# Patient Record
(demographics unavailable — no encounter records)

---

## 2024-10-15 NOTE — END OF VISIT
[Time Spent: ___ minutes] : I have spent [unfilled] minutes of time on the encounter which excludes teaching and separately reported services. [FreeTextEntry2] : This note was written by Tish Webster on 09/28/2023, acting solely as a scribe for Dr. Evert Vega MD.

## 2024-10-15 NOTE — ASSESSMENT
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 On 10/25/2022 MR Breast demonstrated findings consistent with inflammatory carcinoma involving the entire left breast. A 7mm internal mammary lymph node is noted.  S/p punch biopsy of L breast on 10/27/22 - positive for atypical cells in dermal lymphovascular spaces compatible with involvement by mammary carcinoma.  S/p b/l axilla US with abnormal LN bilaterally, s/p biopsy on 11/10/22 -c/w bilateral axillary LNs with poorly differentiated carcinoma, ER >99% [place] >99% HER2 3+ Positive S/p port placement on 11/9/22 Began TCHP on 11/17/22, carboplatin was dropped after 2 cycles given contralateral lymph node positive disease.  Completed 6 cycles of THP on 3/6/23. Unfortunately, she was hospitalized at [facility] from 3/15/23-5/12/23 for septic shock, ARDS s/p ECMO, required vent / PEG, CRRT. Fully recovered. Given stage IV disease, resumed Herceptin / Perjeta on 9/7/23, and anastrozole was started. She developed diarrhea & she refused further treatment altogether.   Now presenting with left breast mass, ulcerated nodule left upper inner quadrant and erythema occupying left breast and right breast. +Back pain   Plan: PET and brain MRI asap, will call with results.  Will need biopsy after PET  Labs today Neoplasm related pain - Gabapentin and oxycodone Rx'ed RTO after above work up

## 2024-10-15 NOTE — HISTORY OF PRESENT ILLNESS
[de-identified] : Ms. Negron is a 52 year old female diagnosed with left breast 8.6cm invasive mammary carcinoma ER >90% [place] >90% HER2 3+ Positive at age 51.  Patient presents for initial visit with daughter. Pt's native language is Greenlandic. Language Line  # Larisa #[id number]  Patient obtained bilateral mammogram and left sonogram on 9/22/2022 demonstrating right multiple small nodules central right breast, recommendation for targeted imaging and left 3cm mass like area of shadowing retroareolar left breast with skin thickening in conjunction with clinical symptoms, 2cm area of microcalcificatoins left UOQ, recommendation for left central, retroareolar US guided biopsy.  10/11/2022 Underwent left breast retroareolar biopsy- invasive mammary carcinoma with ductal and lobular features,  ER >90% [place] >90% HER2 3+, measuring 1.5cm, foci suspicious for intralymphatic tumor embolia are present, cystic papillary apocrine metaplasia   10/25/2022 MR Breasts- findings consistent with inflammatory carcinoma involving the entire left breast. A 7mm internal mammary lymph node is noted. The right breast in unremarkable. No adenopathy is identified.   PMH: Denies  FMH: Denies Sx: Tubal ligation  Socx:  Non smoker, no Etoh.   Care Team:  Breast Surgeon, Dr. Ladd   Treatment summary: -Began TCHP on 11/17/22, carboplatin was dropped after 2 cycles given contralateral lymph node positive disease. -Completed 6 cycles of THP on 3/6/23. -Hospitalized at [facility] from 3/15/23-5/12/23 for septic shock, ARDS s/p ECMO, required vent / PEG, CRRT. [de-identified] : Patient presents for follow up visit, accompanied by her daughters. Language line  #[id number] She reports breast pain bilaterally, initially started with left breast pain and redness, and then also noted right breast pain and redness. Notes burning pain.  She also reports back pain x 1 week. Denies headaches, dizziness, changes in balance.  Port removal 10/23/23

## 2024-10-15 NOTE — REVIEW OF SYSTEMS
[Fever] : fever [Fatigue] : fatigue [Abdominal Pain] : abdominal pain [Vomiting] : vomiting [Diarrhea: Grade 2 - Increase of 4 - 6 stools per day over baseline; moderate increase in ostomy output compared to baseline] : Diarrhea: Grade 2 - Increase of 4 - 6 stools per day over baseline; moderate increase in ostomy output compared to baseline [FreeTextEntry2] : fever 1x day [de-identified] : 4-5x day; 15 days

## 2024-10-15 NOTE — PHYSICAL EXAM
[Ambulatory and capable of all self care but unable to carry out any work activities] : Status 2- Ambulatory and capable of all self care but unable to carry out any work activities. Up and about more than 50% of waking hours [Normal] : PERRL, EOMI, no conjunctival infection, anicteric [de-identified] : left breast mass occupying most of the left breast, left breast is smaller than the right, 1 cm ulcerated nodule in the upper inner left breast, erythema occupying about half of left breast medially, and laso involing right breast.

## 2024-10-31 NOTE — BEGINNING OF VISIT
[0] : 2) Feeling down, depressed, or hopeless: Not at all (0) [[id number]] : 0 [Pain Scale: ___] : On a scale of 1-10, today the patient's pain is a(n) [unfilled]. [Medication(s)] : Medication(s) [Never] : Never [Patient/Caregiver not ready to engage] : Patient/Caregiver not ready to engage

## 2024-10-31 NOTE — HISTORY OF PRESENT ILLNESS
[de-identified] : Ms. Negron is a 52 year old female diagnosed with left breast 8.6cm invasive mammary carcinoma ER >90% [place] >90% HER2 3+ Positive at age 51.  Patient presents for initial visit with daughter. Pt's native language is Guyanese. Language Line  # Larisa #[id number]  Patient obtained bilateral mammogram and left sonogram on 9/22/2022 demonstrating right multiple small nodules central right breast, recommendation for targeted imaging and left 3cm mass like area of shadowing retroareolar left breast with skin thickening in conjunction with clinical symptoms, 2cm area of microcalcificatoins left UOQ, recommendation for left central, retroareolar US guided biopsy.  10/11/2022 Underwent left breast retroareolar biopsy- invasive mammary carcinoma with ductal and lobular features,  ER >90% [place] >90% HER2 3+, measuring 1.5cm, foci suspicious for intralymphatic tumor embolia are present, cystic papillary apocrine metaplasia   10/25/2022 MR Breasts- findings consistent with inflammatory carcinoma involving the entire left breast. A 7mm internal mammary lymph node is noted. The right breast in unremarkable. No adenopathy is identified.   PMH: Denies  FMH: Denies Sx: Tubal ligation  Socx:  Non smoker, no Etoh.   Care Team:  Breast Surgeon, Dr. Ladd   Treatment summary: -Began TCHP on 11/17/22, carboplatin was dropped after 2 cycles given contralateral lymph node positive disease. -Completed 6 cycles of THP on 3/6/23. -Hospitalized at [facility] from 3/15/23-5/12/23 for septic shock, ARDS s/p ECMO, required vent / PEG, CRRT. -Resumed Herceptin / Perjeta on 9/7/23, and anastrozole was started. She developed diarrhea & she refused further treatment altogether.    [de-identified] : Patient presents for follow up visit, accompanied by her daughters. Language line  #[id number] S/p right axillary LN biopsy on 10/24/24 Notes Oxycodone and Gabapentin  help with the pain, but breast pain returns after a few hours.

## 2024-10-31 NOTE — REVIEW OF SYSTEMS
[Fever] : fever [Fatigue] : fatigue [Abdominal Pain] : abdominal pain [Vomiting] : vomiting [Diarrhea: Grade 2 - Increase of 4 - 6 stools per day over baseline; moderate increase in ostomy output compared to baseline] : Diarrhea: Grade 2 - Increase of 4 - 6 stools per day over baseline; moderate increase in ostomy output compared to baseline [FreeTextEntry2] : fever 1x day [de-identified] : 4-5x day; 15 days

## 2024-10-31 NOTE — PHYSICAL EXAM
[Normal] : affect appropriate [Restricted in physically strenuous activity but ambulatory and able to carry out work of a light or sedentary nature] : Status 1- Restricted in physically strenuous activity but ambulatory and able to carry out work of a light or sedentary nature, e.g., light house work, office work [de-identified] : left breast mass occupying most of the left breast, left breast is smaller than the right, 1 cm ulcerated nodule in the upper inner left breast, erythema occupying about half of left breast medially, and laso involing right breast.

## 2024-10-31 NOTE — PHYSICAL EXAM
[Normal] : affect appropriate [Restricted in physically strenuous activity but ambulatory and able to carry out work of a light or sedentary nature] : Status 1- Restricted in physically strenuous activity but ambulatory and able to carry out work of a light or sedentary nature, e.g., light house work, office work [de-identified] : left breast mass occupying most of the left breast, left breast is smaller than the right, 1 cm ulcerated nodule in the upper inner left breast, erythema occupying about half of left breast medially, and laso involing right breast.

## 2024-10-31 NOTE — BEGINNING OF VISIT
[0] : 2) Feeling down, depressed, or hopeless: Not at all (0) [[id number]] : 0 [Pain Scale: ___] : On a scale of 1-10, today the patient's pain is a(n) [unfilled]. [Medication(s)] : Medication(s) [Never] : Never [Patient/Caregiver not ready to engage] : Patient/Caregiver not ready to engage

## 2024-10-31 NOTE — REVIEW OF SYSTEMS
[Fever] : fever [Fatigue] : fatigue [Abdominal Pain] : abdominal pain [Vomiting] : vomiting [Diarrhea: Grade 2 - Increase of 4 - 6 stools per day over baseline; moderate increase in ostomy output compared to baseline] : Diarrhea: Grade 2 - Increase of 4 - 6 stools per day over baseline; moderate increase in ostomy output compared to baseline [FreeTextEntry2] : fever 1x day [de-identified] : 4-5x day; 15 days

## 2024-10-31 NOTE — ASSESSMENT
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 On 10/25/2022 MR Breast demonstrated findings consistent with inflammatory carcinoma involving the entire left breast. A 7mm internal mammary lymph node is noted.  S/p punch biopsy of L breast on 10/27/22 - positive for atypical cells in dermal lymphovascular spaces compatible with involvement by mammary carcinoma.  S/p b/l axilla US with abnormal LN bilaterally, s/p biopsy on 11/10/22 -c/w bilateral axillary LNs with poorly differentiated carcinoma, ER >99% [place] >99% HER2 3+ Positive S/p port placement on 11/9/22 Began TCHP on 11/17/22, carboplatin was dropped after 2 cycles given contralateral lymph node positive disease.  Completed 6 cycles of THP on 3/6/23. Unfortunately, she was hospitalized at [facility] from 3/15/23-5/12/23 for septic shock, ARDS s/p ECMO, required vent / PEG, CRRT. Fully recovered. Given stage IV disease, resumed Herceptin / Perjeta on 9/7/23, and anastrozole was started. She developed diarrhea & she refused further treatment altogether.   Reviewed 10/21/24 PET - 1. Abnormal FDG uptake in the skin of both breasts, subareolar regions, and breast parenchyma. FDG avid soft tissue nodule anterior to the left pectoralis. FDG uptake in the right pectoralis muscle. FDG avid left inner breast cutaneous nodule. This is all concerning for recurrent breast cancer. 2. FDG avid bilateral axillary lymphadenopathy, consistent with metastases 10/24/24 Brain MRI - [facility]  10/24/25 right axilla LN biopsy - Metastatic carcinoma, consistent with metastasis from breast primary. ER 90%, [place] <1%, HER2 3+  Plan: Stage IV disease given contralateral lymph node involvement. Discussed palliative weekly Taxol x 12-16 weeks with Herceptin / Perjeta q 3 weeks ongoing.  We reviewed side effects of systemic therapy to include but not limited to fatigue, N/V, diarrhea, liver/kidney dysfunction,  myelosuppression,  heart dysfunction, increased risk of infections, neuropathy. She signed informed consent. Extensively reviewed management of diarrhea with Imodium and its appropriate usage of 2 pills after first episode of diarrhea, and then 1 tab after each subsequent dose, and can take total 8 tablets per day. Neoplasm related pain - Gabapentin and oxycodone Rx'ed Echo - asap Chemotherapy to begin after echo is obtain Will plan for port placement also.  Antiestrogen therapy once maintenance HP begins.  Chemo teaching today  RTO with week 3 treatment

## 2024-10-31 NOTE — ASSESSMENT
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 On 10/25/2022 MR Breast demonstrated findings consistent with inflammatory carcinoma involving the entire left breast. A 7mm internal mammary lymph node is noted.  S/p punch biopsy of L breast on 10/27/22 - positive for atypical cells in dermal lymphovascular spaces compatible with involvement by mammary carcinoma.  S/p b/l axilla US with abnormal LN bilaterally, s/p biopsy on 11/10/22 -c/w bilateral axillary LNs with poorly differentiated carcinoma, ER >99% [place] >99% HER2 3+ Positive S/p port placement on 11/9/22 Began TCHP on 11/17/22, carboplatin was dropped after 2 cycles given contralateral lymph node positive disease.  Completed 6 cycles of THP on 3/6/23. Unfortunately, she was hospitalized at [facility] from 3/15/23-5/12/23 for septic shock, ARDS s/p ECMO, required vent / PEG, CRRT. Fully recovered. Given stage IV disease, resumed Herceptin / Perjeta on 9/7/23, and anastrozole was started. She developed diarrhea & she refused further treatment altogether.   Reviewed 10/21/24 PET - 1. Abnormal FDG uptake in the skin of both breasts, subareolar regions, and breast parenchyma. FDG avid soft tissue nodule anterior to the left pectoralis. FDG uptake in the right pectoralis muscle. FDG avid left inner breast cutaneous nodule. This is all concerning for recurrent breast cancer. 2. FDG avid bilateral axillary lymphadenopathy, consistent with metastases 10/24/24 Brain MRI - [facility]  10/24/25 right axilla LN biopsy - Metastatic carcinoma, consistent with metastasis from breast primary. ER 90%, [place] <1%, HER2 3+  Plan: Stage IV disease given contralateral lymph node involvement. Discussed palliative weekly Taxol x 12-16 weeks with Herceptin / Perjeta q 3 weeks ongoing.  We reviewed side effects of systemic therapy to include but not limited to fatigue, N/V, diarrhea, liver/kidney dysfunction,  myelosuppression,  heart dysfunction, increased risk of infections, neuropathy. She signed informed consent. Extensively reviewed management of diarrhea with Imodium and its appropriate usage of 2 pills after first episode of diarrhea, and then 1 tab after each subsequent dose, and can take total 8 tablets per day. Neoplasm related pain - Gabapentin and oxycodone Rx'ed Echo - asap Chemotherapy to begin after echo is obtain Will plan for port placement also.  Antiestrogen therapy once maintenance HP begins.  Chemo teaching today  RTO with week 3 treatment

## 2024-10-31 NOTE — HISTORY OF PRESENT ILLNESS
[de-identified] : Ms. Negron is a 52 year old female diagnosed with left breast 8.6cm invasive mammary carcinoma ER >90% [place] >90% HER2 3+ Positive at age 51.  Patient presents for initial visit with daughter. Pt's native language is Citizen of [place]. Language Line  # Larisa #[id number]  Patient obtained bilateral mammogram and left sonogram on 9/22/2022 demonstrating right multiple small nodules central right breast, recommendation for targeted imaging and left 3cm mass like area of shadowing retroareolar left breast with skin thickening in conjunction with clinical symptoms, 2cm area of microcalcificatoins left UOQ, recommendation for left central, retroareolar US guided biopsy.  10/11/2022 Underwent left breast retroareolar biopsy- invasive mammary carcinoma with ductal and lobular features,  ER >90% [place] >90% HER2 3+, measuring 1.5cm, foci suspicious for intralymphatic tumor embolia are present, cystic papillary apocrine metaplasia   10/25/2022 MR Breasts- findings consistent with inflammatory carcinoma involving the entire left breast. A 7mm internal mammary lymph node is noted. The right breast in unremarkable. No adenopathy is identified.   PMH: Denies  FMH: Denies Sx: Tubal ligation  Socx:  Non smoker, no Etoh.   Care Team:  Breast Surgeon, Dr. Ladd   Treatment summary: -Began TCHP on 11/17/22, carboplatin was dropped after 2 cycles given contralateral lymph node positive disease. -Completed 6 cycles of THP on 3/6/23. -Hospitalized at [facility] from 3/15/23-5/12/23 for septic shock, ARDS s/p ECMO, required vent / PEG, CRRT. -Resumed Herceptin / Perjeta on 9/7/23, and anastrozole was started. She developed diarrhea & she refused further treatment altogether.    [de-identified] : Patient presents for follow up visit, accompanied by her daughters. Language line  #[id number] S/p right axillary LN biopsy on 10/24/24 Notes Oxycodone and Gabapentin  help with the pain, but breast pain returns after a few hours.

## 2024-11-02 NOTE — PROCEDURE
[FreeTextEntry3] : Ultrasound-guided core biopsy right x-ray lymph node  After informed consent was obtained, 1% lidocaine was infiltrated to the skin of the right axilla and core biopsy of the indeterminate right axillary lymph node exhibiting a thickened cortex was performed  No procedure related complications were noted

## 2024-11-02 NOTE — HISTORY OF PRESENT ILLNESS
[FreeTextEntry1] : Referred by Dr. Vega.  Patient states she has been experiencing bilateral axilla pain and burning sensation.  Patient currently has right breast cancer.  History of left breast cancer, treated with chemotherapy.   No family history of breast cancer.

## 2024-11-02 NOTE — ASSESSMENT
[FreeTextEntry1] : Ultrasound-guided core biopsy right axillary lymph node  Care plan reviewed with patient and daughter  Further recommendations will be made pending results of the biopsy  A total of 45 minutes was spent in consultation

## 2024-12-05 NOTE — BEGINNING OF VISIT
[0] : 2) Feeling down, depressed, or hopeless: Not at all (0) [[id number]] : 0 [Pain Scale: ___] : On a scale of 1-10, today the patient's pain is a(n) [unfilled]. [Never] : Never [Patient/Caregiver not ready to engage] : Patient/Caregiver not ready to engage

## 2024-12-05 NOTE — PHYSICAL EXAM
[Restricted in physically strenuous activity but ambulatory and able to carry out work of a light or sedentary nature] : Status 1- Restricted in physically strenuous activity but ambulatory and able to carry out work of a light or sedentary nature, e.g., light house work, office work [Normal] : affect appropriate [de-identified] : Decrease in erythema of both breasts, more symmetric, no oozing from left breast

## 2024-12-05 NOTE — ASSESSMENT
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 On 10/25/2022 MR Breast demonstrated findings consistent with inflammatory carcinoma involving the entire left breast. A 7mm internal mammary lymph node is noted.  S/p punch biopsy of L breast on 10/27/22 - positive for atypical cells in dermal lymphovascular spaces compatible with involvement by mammary carcinoma.  S/p b/l axilla US with abnormal LN bilaterally, s/p biopsy on 11/10/22 -c/w bilateral axillary LNs with poorly differentiated carcinoma, ER >99% [place] >99% HER2 3+ Positive S/p port placement on 11/9/22 Began TCHP on 11/17/22, carboplatin was dropped after 2 cycles given contralateral lymph node positive disease.  Completed 6 cycles of THP on 3/6/23. Unfortunately, she was hospitalized at [facility] from 3/15/23-5/12/23 for septic shock, ARDS s/p ECMO, required vent / PEG, CRRT. Fully recovered. Given stage IV disease, resumed Herceptin / Perjeta on 9/7/23, and anastrozole was started. She developed diarrhea & she refused further treatment altogether.    #stage IV left inflammatory breast cancer, with contralateral lymph node involvement, ER 90%, [place] <1%, HER2 3+ -Relapsed disease in 10/2024.  10/21/24 PET - 1. Abnormal FDG uptake in the skin of both breasts, subareolar regions, and breast parenchyma. FDG avid soft tissue nodule anterior to the left pectoralis. FDG uptake in the right pectoralis muscle. FDG avid left inner breast cutaneous nodule. This is all concerning for recurrent breast cancer. 2. FDG avid bilateral axillary lymphadenopathy, consistent with metastases 10/24/24 Brain MRI - [facility] -10/24/25 right axilla LN biopsy - Metastatic carcinoma, consistent with metastasis from breast primary. ER 90%, [place] <1%, HER2 3+ -relapse >12 months from last Her2 directed therapy -11/14/24 started Taxol /Herceptin / Perjeta -today is C2 Taxol+HP, tolerating treatment well -continue treatment. -antiestrogen therapy once on maintenance HP  RTO 6 weeks

## 2024-12-05 NOTE — ASSESSMENT
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 On 10/25/2022 MR Breast demonstrated findings consistent with inflammatory carcinoma involving the entire left breast. A 7mm internal mammary lymph node is noted.  S/p punch biopsy of L breast on 10/27/22 - positive for atypical cells in dermal lymphovascular spaces compatible with involvement by mammary carcinoma.  S/p b/l axilla US with abnormal LN bilaterally, s/p biopsy on 11/10/22 -c/w bilateral axillary LNs with poorly differentiated carcinoma, ER >99% [place] >99% HER2 3+ Positive S/p port placement on 11/9/22 Began TCHP on 11/17/22, carboplatin was dropped after 2 cycles given contralateral lymph node positive disease.  Completed 6 cycles of THP on 3/6/23. Unfortunately, she was hospitalized at [facility] from 3/15/23-5/12/23 for septic shock, ARDS s/p ECMO, required vent / PEG, CRRT. Fully recovered. Given stage IV disease, resumed Herceptin / Perjeta on 9/7/23, and anastrozole was started. She developed diarrhea & she refused further treatment altogether.    #stage IV left inflammatory breast cancer, with contralateral lymph node involvement, ER 90%, [place] <1%, HER2 3+ -Relapsed disease in 10/2024.  10/21/24 PET - 1. Abnormal FDG uptake in the skin of both breasts, subareolar regions, and breast parenchyma. FDG avid soft tissue nodule anterior to the left pectoralis. FDG uptake in the right pectoralis muscle. FDG avid left inner breast cutaneous nodule. This is all concerning for recurrent breast cancer. 2. FDG avid bilateral axillary lymphadenopathy, consistent with metastases 10/24/24 Brain MRI - [facility] -10/24/25 right axilla LN biopsy - Metastatic carcinoma, consistent with metastasis from breast primary. ER 90%, [place] <1%, HER2 3+ -relapse >12 months from last Her2 directed therapy -11/14/24 started Taxol /Herceptin / Perjeta -today is C2 Taxol+HP, tolerating treatment well -continue treatment. -antiestrogen therapy once on maintenance HP  RTO 6 weeks

## 2024-12-05 NOTE — HISTORY OF PRESENT ILLNESS
[de-identified] : Ms. Negron is a 52 year old female diagnosed with left breast 8.6cm invasive mammary carcinoma ER >90% [place] >90% HER2 3+ Positive at age 51.  Patient presents for initial visit with daughter. Pt's native language is [place]. Language Line  # Larisa #[id number]  Patient obtained bilateral mammogram and left sonogram on 9/22/2022 demonstrating right multiple small nodules central right breast, recommendation for targeted imaging and left 3cm mass like area of shadowing retroareolar left breast with skin thickening in conjunction with clinical symptoms, 2cm area of microcalcificatoins left UOQ, recommendation for left central, retroareolar US guided biopsy.  10/11/2022 Underwent left breast retroareolar biopsy- invasive mammary carcinoma with ductal and lobular features,  ER >90% [place] >90% HER2 3+, measuring 1.5cm, foci suspicious for intralymphatic tumor embolia are present, cystic papillary apocrine metaplasia   10/25/2022 MR Breasts- findings consistent with inflammatory carcinoma involving the entire left breast. A 7mm internal mammary lymph node is noted. The right breast in unremarkable. No adenopathy is identified.   PMH: Denies  FMH: Denies Sx: Tubal ligation  Socx:  Non smoker, no Etoh.   Care Team:  Breast Surgeon, Dr. Ladd   Treatment summary: -Began TCHP on 11/17/22, carboplatin was dropped after 2 cycles given contralateral lymph node positive disease. -Completed 6 cycles of THP on 3/6/23. -Hospitalized at [facility] from 3/15/23-5/12/23 for septic shock, ARDS s/p ECMO, required vent / PEG, CRRT. -Resumed Herceptin / Perjeta on 9/7/23, and anastrozole was started. She developed diarrhea & she refused further treatment altogether.    [de-identified] : Patient presents for follow up visit, accompanied by her daughter Started weekly Taxol / Herceptin / Perjeta 11/14/24 Notes improvement in breast pain, decrease in erythema. Not taking pain meds anymore.  No neuropathy Mild fatigue Good appetite Rare nausea.

## 2024-12-05 NOTE — BEGINNING OF VISIT
[0] : 2) Feeling down, depressed, or hopeless: Not at all (0) [[id number]] : 0 [Pain Scale: ___] : On a scale of 1-10, today the patient's pain is a(n) [unfilled]. [Never] : Never [Patient/Caregiver not ready to engage] : Patient/Caregiver not ready to engage

## 2024-12-05 NOTE — PHYSICAL EXAM
[Restricted in physically strenuous activity but ambulatory and able to carry out work of a light or sedentary nature] : Status 1- Restricted in physically strenuous activity but ambulatory and able to carry out work of a light or sedentary nature, e.g., light house work, office work [Normal] : affect appropriate [de-identified] : Decrease in erythema of both breasts, more symmetric, no oozing from left breast

## 2024-12-05 NOTE — HISTORY OF PRESENT ILLNESS
[de-identified] : Ms. Negron is a 52 year old female diagnosed with left breast 8.6cm invasive mammary carcinoma ER >90% MO >90% HER2 3+ Positive at age 51.  Patient presents for initial visit with daughter. Pt's native language is East Timorese. Language Line  # Larisa #[id number]  Patient obtained bilateral mammogram and left sonogram on 9/22/2022 demonstrating right multiple small nodules central right breast, recommendation for targeted imaging and left 3cm mass like area of shadowing retroareolar left breast with skin thickening in conjunction with clinical symptoms, 2cm area of microcalcificatoins left UOQ, recommendation for left central, retroareolar US guided biopsy.  10/11/2022 Underwent left breast retroareolar biopsy- invasive mammary carcinoma with ductal and lobular features,  ER >90% MO >90% HER2 3+, measuring 1.5cm, foci suspicious for intralymphatic tumor embolia are present, cystic papillary apocrine metaplasia   10/25/2022 MR Breasts- findings consistent with inflammatory carcinoma involving the entire left breast. A 7mm internal mammary lymph node is noted. The right breast in unremarkable. No adenopathy is identified.   PMH: Denies  FMH: Denies Sx: Tubal ligation  Socx:  Non smoker, no Etoh.   Care Team:  Breast Surgeon, Dr. Ladd   Treatment summary: -Began TCHP on 11/17/22, carboplatin was dropped after 2 cycles given contralateral lymph node positive disease. -Completed 6 cycles of THP on 3/6/23. -Hospitalized at [facility] from 3/15/23-5/12/23 for septic shock, ARDS s/p ECMO, required vent / PEG, CRRT. -Resumed Herceptin / Perjeta on 9/7/23, and anastrozole was started. She developed diarrhea & she refused further treatment altogether.    [de-identified] : Patient presents for follow up visit, accompanied by her daughter Started weekly Taxol / Herceptin / Perjeta 11/14/24 Notes improvement in breast pain, decrease in erythema. Not taking pain meds anymore.  No neuropathy Mild fatigue Good appetite Rare nausea.

## 2024-12-05 NOTE — REVIEW OF SYSTEMS
[Diarrhea: Grade 2 - Increase of 4 - 6 stools per day over baseline; moderate increase in ostomy output compared to baseline] : Diarrhea: Grade 2 - Increase of 4 - 6 stools per day over baseline; moderate increase in ostomy output compared to baseline [FreeTextEntry2] : negative except as reviewed in interval history [de-identified] : 4-5x day; 15 days

## 2025-01-16 NOTE — ASSESSMENT
[FreeTextEntry1] : 53 year old  (LMP 8/2022) female diagnosed with left breast 8.6cm invasive mammary carcinoma ER >99% OH >99% HER2 3+ Positive. On 10/25/2022 MR Breast demonstrated findings consistent with inflammatory carcinoma involving the entire left breast. A 7mm internal mammary lymph node is noted.  S/p punch biopsy of L breast on 10/27/22 - positive for atypical cells in dermal lymphovascular spaces compatible with involvement by mammary carcinoma.  S/p b/l axilla US with abnormal LN bilaterally, s/p biopsy on 11/10/22 -c/w bilateral axillary LNs with poorly differentiated carcinoma, ER >99% OH >99% HER2 3+ Positive S/p port placement on 11/9/22 Began TCHP on 11/17/22, carboplatin was dropped after 2 cycles given contralateral lymph node positive disease.  Completed 6 cycles of THP on 3/6/23. Unfortunately, she was hospitalized at [facility] from 3/15/23-5/12/23 for septic shock, ARDS s/p ECMO, required vent / PEG, CRRT. Fully recovered. Given stage IV disease, resumed Herceptin / Perjeta on 9/7/23, and anastrozole was started. She developed diarrhea & she refused further treatment altogether.    #stage IV left inflammatory breast cancer, with contralateral lymph node involvement, ER 90%, OH <1%, HER2 3+ -Relapsed disease in 10/2024.  10/21/24 PET - 1. Abnormal FDG uptake in the skin of both breasts, subareolar regions, and breast parenchyma. FDG avid soft tissue nodule anterior to the left pectoralis. FDG uptake in the right pectoralis muscle. FDG avid left inner breast cutaneous nodule. This is all concerning for recurrent breast cancer. 2. FDG avid bilateral axillary lymphadenopathy, consistent with metastases 10/24/24 Brain MRI - NERI -10/24/25 right axilla LN biopsy - Metastatic carcinoma, consistent with metastasis from breast primary. ER 90%, OH <1%, HER2 3+ -relapse >12 months from last Her2 directed therapy -11/14/24 started Taxol /Herceptin / Perjeta -today is C4d1 Taxol+HP, tolerating treatment well -continue treatment. -Pet scan ordered, advised to schedule asap -antiestrogen therapy once on maintenance HP  RTO 6 weeks

## 2025-01-16 NOTE — PHYSICAL EXAM
[Restricted in physically strenuous activity but ambulatory and able to carry out work of a light or sedentary nature] : Status 1- Restricted in physically strenuous activity but ambulatory and able to carry out work of a light or sedentary nature, e.g., light house work, office work [Normal] : no peripheral adenopathy appreciated [de-identified] : Decrease in erythema of both breasts, more symmetric, no oozing from left breast, mild hardness on the left inner lower quadrant, non tender

## 2025-01-16 NOTE — REASON FOR VISIT
[Follow-Up Visit] : a follow-up [Other: _____] : [unfilled] [Language Line ] : provided by Language Line   [Time Spent: ____ minutes] : Total time spent using  services: [unfilled] minutes. The patient's primary language is not English thus required  services. [FreeTextEntry2] : Breast Cancer  [Interpreters_IDNumber] : [id number] [Interpreters_FullName] : Richard [TWNoteComboBox1] : [place] African

## 2025-01-16 NOTE — HISTORY OF PRESENT ILLNESS
[de-identified] : Ms. Negron is a 52 year old female diagnosed with left breast 8.6cm invasive mammary carcinoma ER >90% LA >90% HER2 3+ Positive at age 51.  Patient presents for initial visit with daughter. Pt's native language is Algerian. Language Line  # Larisa #[id number]  Patient obtained bilateral mammogram and left sonogram on 9/22/2022 demonstrating right multiple small nodules central right breast, recommendation for targeted imaging and left 3cm mass like area of shadowing retroareolar left breast with skin thickening in conjunction with clinical symptoms, 2cm area of microcalcificatoins left UOQ, recommendation for left central, retroareolar US guided biopsy.  10/11/2022 Underwent left breast retroareolar biopsy- invasive mammary carcinoma with ductal and lobular features,  ER >90% LA >90% HER2 3+, measuring 1.5cm, foci suspicious for intralymphatic tumor embolia are present, cystic papillary apocrine metaplasia   10/25/2022 MR Breasts- findings consistent with inflammatory carcinoma involving the entire left breast. A 7mm internal mammary lymph node is noted. The right breast in unremarkable. No adenopathy is identified.   PMH: Denies  FMH: Denies Sx: Tubal ligation  Socx:  Non smoker, no Etoh.   Care Team:  Breast Surgeon, Dr. Ladd   Treatment summary: -Began TCHP on 11/17/22, carboplatin was dropped after 2 cycles given contralateral lymph node positive disease. -Completed 6 cycles of THP on 3/6/23. -Hospitalized at [facility] from 3/15/23-5/12/23 for septic shock, ARDS s/p ECMO, required vent / PEG, CRRT. -Resumed Herceptin / Perjeta on 9/7/23, and anastrozole was started. She developed diarrhea & she refused further treatment altogether.    [de-identified] : Patient presents for follow up and treatment visit today. Met patient at the treatment room chair side. Daughter present during visit.  Richard ID # [id number] Started THP on 11/14/24, C4D1 due today Reports improvement in breast pain, decrease in erythema and less hardness.   Reports mild fatigue Reports taste not good, able to eat food Reports diarrhea, 3 days after treatment 2-3/day for 1-2 days, imodium helps Denies numbness, tingling or burning of hands and feet  Denies nausea, vomiting, abdominal pain, mucositis Denies sob, chest pain Denies fever, infection

## 2025-01-16 NOTE — REVIEW OF SYSTEMS
[Diarrhea: Grade 2 - Increase of 4 - 6 stools per day over baseline; moderate increase in ostomy output compared to baseline] : Diarrhea: Grade 2 - Increase of 4 - 6 stools per day over baseline; moderate increase in ostomy output compared to baseline [Fever] : no fever [Chills] : no chills [Night Sweats] : no night sweats [Fatigue] : fatigue [Recent Change In Weight] : ~T no recent weight change [Negative] : Allergic/Immunologic [de-identified] : 4-5x day; 15 days

## 2025-02-24 NOTE — HISTORY OF PRESENT ILLNESS
[de-identified] : Ms. Negron is a 52 year old female diagnosed with left breast 8.6cm invasive mammary carcinoma ER >90% MI >90% HER2 3+ Positive at age 51.  Patient presents for initial visit with daughter. Pt's native language is Beninese. Language Line  # Larisa #[id number]  Patient obtained bilateral mammogram and left sonogram on 9/22/2022 demonstrating right multiple small nodules central right breast, recommendation for targeted imaging and left 3cm mass like area of shadowing retroareolar left breast with skin thickening in conjunction with clinical symptoms, 2cm area of microcalcificatoins left UOQ, recommendation for left central, retroareolar US guided biopsy.  10/11/2022 Underwent left breast retroareolar biopsy- invasive mammary carcinoma with ductal and lobular features,  ER >90% MI >90% HER2 3+, measuring 1.5cm, foci suspicious for intralymphatic tumor embolia are present, cystic papillary apocrine metaplasia   10/25/2022 MR Breasts- findings consistent with inflammatory carcinoma involving the entire left breast. A 7mm internal mammary lymph node is noted. The right breast in unremarkable. No adenopathy is identified.   PMH: Denies  FMH: Denies Sx: Tubal ligation  Socx:  Non smoker, no Etoh.   Care Team:  Breast Surgeon, Dr. Ladd   Treatment summary: -Began TCHP on 11/17/22, carboplatin was dropped after 2 cycles given contralateral lymph node positive disease. -Completed 6 cycles of THP on 3/6/23. -Hospitalized at [facility] from 3/15/23-5/12/23 for septic shock, ARDS s/p ECMO, required vent / PEG, CRRT. -Resumed Herceptin / Perjeta on 9/7/23, and anastrozole was started. She developed diarrhea & she refused further treatment altogether.    [de-identified] : Patient presents for follow up with daughter   Ricardo ID # [id number] Started THP on 11/14/24, C4D1 on 1/16/25 Received C5 HP on 2/6/25, s/p weekly taxol on 2/20/25 s/p hospitalization at [facility] for URI, discharged on 1/30/25, s/p abx for infection She reports feeling fine overall Denies fever, but reported throat pain, was coughing up phlegm, now currently denies throat pain Pt reports skin of breast is significantly improved She reports her hand and feet are sensitive Denies headaches, dizziness, balance issues Family reports some diarrhea, not everyday, usually happens on day of treatment and 2 days after, takes Immodium with relief

## 2025-02-24 NOTE — ADDENDUM
[FreeTextEntry1] : Documented by Onur Harris acting as scribe for Dr. Vega on  02/24/2025.    All Medical record entries made by the Scribe were at my, Dr. Vega's, direction and personally dictated by me on  02/24/2025. I have reviewed the chart and agree that the record accurately reflects my personal performance of the history, physical exam, assessment and plan. I have also personally directed, reviewed, and agreed with the discharge instructions.

## 2025-02-24 NOTE — REVIEW OF SYSTEMS
[Fatigue] : fatigue [Diarrhea: Grade 2 - Increase of 4 - 6 stools per day over baseline; moderate increase in ostomy output compared to baseline] : Diarrhea: Grade 2 - Increase of 4 - 6 stools per day over baseline; moderate increase in ostomy output compared to baseline [Negative] : Allergic/Immunologic [Fever] : no fever [Chills] : no chills [Night Sweats] : no night sweats [Recent Change In Weight] : ~T no recent weight change [de-identified] : 4-5x day; 15 days

## 2025-02-24 NOTE — BEGINNING OF VISIT
[Medical reason not done] : Medical reason not done [0] : 2) Feeling down, depressed, or hopeless: Not at all (0) [Never] : Never [Date Discussed (MM/DD/YY): ___] : Discussed: [unfilled] [Patient/Caregiver not ready to engage] : Patient/Caregiver not ready to engage [[id number]] : 0 [Pain Scale: ___] : On a scale of 1-10, today the patient's pain is a(n) [unfilled].

## 2025-02-24 NOTE — PHYSICAL EXAM
[Restricted in physically strenuous activity but ambulatory and able to carry out work of a light or sedentary nature] : Status 1- Restricted in physically strenuous activity but ambulatory and able to carry out work of a light or sedentary nature, e.g., light house work, office work [Normal] : affect appropriate [de-identified] : decrease in erythema of both breasts medially, left is greater than right

## 2025-02-24 NOTE — ASSESSMENT
[FreeTextEntry1] : 53 year old  (LMP 8/2022) female diagnosed with left breast 8.6cm invasive mammary carcinoma ER >99% MI >99% HER2 3+ Positive. On 10/25/2022 MR Breast demonstrated findings consistent with inflammatory carcinoma involving the entire left breast. A 7mm internal mammary lymph node is noted.  S/p punch biopsy of L breast on 10/27/22 - positive for atypical cells in dermal lymphovascular spaces compatible with involvement by mammary carcinoma.  S/p b/l axilla US with abnormal LN bilaterally, s/p biopsy on 11/10/22 -c/w bilateral axillary LNs with poorly differentiated carcinoma, ER >99% MI >99% HER2 3+ Positive S/p port placement on 11/9/22 Began TCHP on 11/17/22, carboplatin was dropped after 2 cycles given contralateral lymph node positive disease.  Completed 6 cycles of THP on 3/6/23. Unfortunately, she was hospitalized at [facility] from 3/15/23-5/12/23 for septic shock, ARDS s/p ECMO, required vent / PEG, CRRT. Fully recovered. Given stage IV disease, resumed Herceptin / Perjeta on 9/7/23, and anastrozole was started. She developed diarrhea & she refused further treatment altogether.    #stage IV left inflammatory breast cancer, with contralateral lymph node involvement, ER 90%, MI <1%, HER2 3+ -Relapsed disease in 10/2024.  10/21/24 PET - 1. Abnormal FDG uptake in the skin of both breasts, subareolar regions, and breast parenchyma. FDG avid soft tissue nodule anterior to the left pectoralis. FDG uptake in the right pectoralis muscle. FDG avid left inner breast cutaneous nodule. This is all concerning for recurrent breast cancer. 2. FDG avid bilateral axillary lymphadenopathy, consistent with metastases 10/24/24 Brain MRI - [facility] -10/24/25 right axilla LN biopsy - Metastatic carcinoma, consistent with metastasis from breast primary. ER 90%, MI <1%, HER2 3+ -relapse >12 months from last Her2 directed therapy -11/14/24 started Taxol /Herceptin / Perjeta -Completed C4 Taxol+HP on 1/16/25, s/p C5 HP on 2/6/25, tolerating treatment well - Reviewed 2/12/25 PET/CT - partial response -continue weekly Taxol, dose reduce to 70 mg/m2 week for additional 1-2 months. Continue HP -antiestrogen therapy once on maintenance HP  RTO 1 month

## 2025-02-24 NOTE — REVIEW OF SYSTEMS
[Fatigue] : fatigue [Diarrhea: Grade 2 - Increase of 4 - 6 stools per day over baseline; moderate increase in ostomy output compared to baseline] : Diarrhea: Grade 2 - Increase of 4 - 6 stools per day over baseline; moderate increase in ostomy output compared to baseline [Negative] : Allergic/Immunologic [Fever] : no fever [Chills] : no chills [Night Sweats] : no night sweats [Recent Change In Weight] : ~T no recent weight change [de-identified] : 4-5x day; 15 days

## 2025-02-24 NOTE — REASON FOR VISIT
[Follow-Up Visit] : a follow-up [Other: _____] : [unfilled] [Language Line ] : provided by Language Line   [FreeTextEntry2] : Breast Cancer  [Interpreters_IDNumber] : [id number] [Interpreters_FullName] : Richard [TWNoteComboBox1] : Singaporean

## 2025-02-24 NOTE — BEGINNING OF VISIT
[Medical reason not done] : Medical reason not done [0] : 2) Feeling down, depressed, or hopeless: Not at all (0) [Never] : Never [Date Discussed (MM/DD/YY): ___] : Discussed: [unfilled] [Patient/Caregiver not ready to engage] : Patient/Caregiver not ready to engage [[id number]] : 0 [Pain Scale: ___] : On a scale of 1-10, today the patient's pain is a(n) [unfilled].

## 2025-02-24 NOTE — PHYSICAL EXAM
[Restricted in physically strenuous activity but ambulatory and able to carry out work of a light or sedentary nature] : Status 1- Restricted in physically strenuous activity but ambulatory and able to carry out work of a light or sedentary nature, e.g., light house work, office work [Normal] : affect appropriate [de-identified] : decrease in erythema of both breasts medially, left is greater than right

## 2025-02-24 NOTE — HISTORY OF PRESENT ILLNESS
[de-identified] : Ms. Negron is a 52 year old female diagnosed with left breast 8.6cm invasive mammary carcinoma ER >90% KS >90% HER2 3+ Positive at age 51.  Patient presents for initial visit with daughter. Pt's native language is Chinese. Language Line  # Larisa #[id number]  Patient obtained bilateral mammogram and left sonogram on 9/22/2022 demonstrating right multiple small nodules central right breast, recommendation for targeted imaging and left 3cm mass like area of shadowing retroareolar left breast with skin thickening in conjunction with clinical symptoms, 2cm area of microcalcificatoins left UOQ, recommendation for left central, retroareolar US guided biopsy.  10/11/2022 Underwent left breast retroareolar biopsy- invasive mammary carcinoma with ductal and lobular features,  ER >90% KS >90% HER2 3+, measuring 1.5cm, foci suspicious for intralymphatic tumor embolia are present, cystic papillary apocrine metaplasia   10/25/2022 MR Breasts- findings consistent with inflammatory carcinoma involving the entire left breast. A 7mm internal mammary lymph node is noted. The right breast in unremarkable. No adenopathy is identified.   PMH: Denies  FMH: Denies Sx: Tubal ligation  Socx:  Non smoker, no Etoh.   Care Team:  Breast Surgeon, Dr. Ladd   Treatment summary: -Began TCHP on 11/17/22, carboplatin was dropped after 2 cycles given contralateral lymph node positive disease. -Completed 6 cycles of THP on 3/6/23. -Hospitalized at [facility] from 3/15/23-5/12/23 for septic shock, ARDS s/p ECMO, required vent / PEG, CRRT. -Resumed Herceptin / Perjeta on 9/7/23, and anastrozole was started. She developed diarrhea & she refused further treatment altogether.    [de-identified] : Patient presents for follow up with daughter   Ricardo ID # [id number] Started THP on 11/14/24, C4D1 on 1/16/25 Received C5 HP on 2/6/25, s/p weekly taxol on 2/20/25 s/p hospitalization at [facility] for URI, discharged on 1/30/25, s/p abx for infection She reports feeling fine overall Denies fever, but reported throat pain, was coughing up phlegm, now currently denies throat pain Pt reports skin of breast is significantly improved She reports her hand and feet are sensitive Denies headaches, dizziness, balance issues Family reports some diarrhea, not everyday, usually happens on day of treatment and 2 days after, takes Immodium with relief

## 2025-02-24 NOTE — REASON FOR VISIT
[Follow-Up Visit] : a follow-up [Other: _____] : [unfilled] [Language Line ] : provided by Language Line   [FreeTextEntry2] : Breast Cancer  [Interpreters_IDNumber] : [id number] [Interpreters_FullName] : Richard [TWNoteComboBox1] : Armenian

## 2025-02-24 NOTE — ASSESSMENT
[FreeTextEntry1] : 53 year old  (LMP 8/2022) female diagnosed with left breast 8.6cm invasive mammary carcinoma ER >99% CA >99% HER2 3+ Positive. On 10/25/2022 MR Breast demonstrated findings consistent with inflammatory carcinoma involving the entire left breast. A 7mm internal mammary lymph node is noted.  S/p punch biopsy of L breast on 10/27/22 - positive for atypical cells in dermal lymphovascular spaces compatible with involvement by mammary carcinoma.  S/p b/l axilla US with abnormal LN bilaterally, s/p biopsy on 11/10/22 -c/w bilateral axillary LNs with poorly differentiated carcinoma, ER >99% CA >99% HER2 3+ Positive S/p port placement on 11/9/22 Began TCHP on 11/17/22, carboplatin was dropped after 2 cycles given contralateral lymph node positive disease.  Completed 6 cycles of THP on 3/6/23. Unfortunately, she was hospitalized at [facility] from 3/15/23-5/12/23 for septic shock, ARDS s/p ECMO, required vent / PEG, CRRT. Fully recovered. Given stage IV disease, resumed Herceptin / Perjeta on 9/7/23, and anastrozole was started. She developed diarrhea & she refused further treatment altogether.    #stage IV left inflammatory breast cancer, with contralateral lymph node involvement, ER 90%, CA <1%, HER2 3+ -Relapsed disease in 10/2024.  10/21/24 PET - 1. Abnormal FDG uptake in the skin of both breasts, subareolar regions, and breast parenchyma. FDG avid soft tissue nodule anterior to the left pectoralis. FDG uptake in the right pectoralis muscle. FDG avid left inner breast cutaneous nodule. This is all concerning for recurrent breast cancer. 2. FDG avid bilateral axillary lymphadenopathy, consistent with metastases 10/24/24 Brain MRI - [facility]ERI -10/24/25 right axilla LN biopsy - Metastatic carcinoma, consistent with metastasis from breast primary. ER 90%, CA <1%, HER2 3+ -relapse >12 months from last Her2 directed therapy -11/14/24 started Taxol /Herceptin / Perjeta -Completed C4 Taxol+HP on 1/16/25, s/p C5 HP on 2/6/25, tolerating treatment well - Reviewed 2/12/25 PET/CT - partial response -continue weekly Taxol, dose reduce to 70 mg/m2 week for additional 1-2 months. Continue HP -antiestrogen therapy once on maintenance HP  RTO 1 month

## 2025-03-20 NOTE — PHYSICAL EXAM
[Restricted in physically strenuous activity but ambulatory and able to carry out work of a light or sedentary nature] : Status 1- Restricted in physically strenuous activity but ambulatory and able to carry out work of a light or sedentary nature, e.g., light house work, office work [Normal] : affect appropriate [de-identified] : decrease in erythema of both breasts medially, left is greater than right

## 2025-03-20 NOTE — REVIEW OF SYSTEMS
[Fatigue] : fatigue [Diarrhea: Grade 2 - Increase of 4 - 6 stools per day over baseline; moderate increase in ostomy output compared to baseline] : Diarrhea: Grade 2 - Increase of 4 - 6 stools per day over baseline; moderate increase in ostomy output compared to baseline [Negative] : Allergic/Immunologic [Fever] : no fever [Chills] : no chills [Night Sweats] : no night sweats [Recent Change In Weight] : ~T no recent weight change [de-identified] : 4-5x day; 15 days

## 2025-03-20 NOTE — ASSESSMENT
[FreeTextEntry1] : 53 year old  (LMP 8/2022) female diagnosed with left breast 8.6cm invasive mammary carcinoma ER >99% HI >99% HER2 3+ Positive. On 10/25/2022 MR Breast demonstrated findings consistent with inflammatory carcinoma involving the entire left breast. A 7mm internal mammary lymph node is noted.  S/p punch biopsy of L breast on 10/27/22 - positive for atypical cells in dermal lymphovascular spaces compatible with involvement by mammary carcinoma.  S/p b/l axilla US with abnormal LN bilaterally, s/p biopsy on 11/10/22 -c/w bilateral axillary LNs with poorly differentiated carcinoma, ER >99% HI >99% HER2 3+ Positive S/p port placement on 11/9/22 Began TCHP on 11/17/22, carboplatin was dropped after 2 cycles given contralateral lymph node positive disease.  Completed 6 cycles of THP on 3/6/23. Unfortunately, she was hospitalized at [facility] from 3/15/23-5/12/23 for septic shock, ARDS s/p ECMO, required vent / PEG, CRRT. Fully recovered. Given stage IV disease, resumed Herceptin / Perjeta on 9/7/23, and anastrozole was started. She developed diarrhea & she refused further treatment altogether.    #stage IV left inflammatory breast cancer, with contralateral lymph node involvement, ER 90%, HI <1%, HER2 3+ -Relapsed disease in 10/2024.  10/21/24 PET - 1. Abnormal FDG uptake in the skin of both breasts, subareolar regions, and breast parenchyma. FDG avid soft tissue nodule anterior to the left pectoralis. FDG uptake in the right pectoralis muscle. FDG avid left inner breast cutaneous nodule. This is all concerning for recurrent breast cancer. 2. FDG avid bilateral axillary lymphadenopathy, consistent with metastases 10/24/24 Brain MRI - NERI -10/24/25 right axilla LN biopsy - Metastatic carcinoma, consistent with metastasis from breast primary. ER 90%, HI <1%, HER2 3+ -relapse >12 months from last Her2 directed therapy -11/14/24 started Taxol /Herceptin / Perjeta - 2/12/25 PET/CT - partial response -Completed C7D1 Taxol+HP today, tolerating treatment well -continue weekly Taxol, dose reduce to 70 mg/m2 for total of 8 cycles with HP -Will begin antiestrogen therapy + consider ibrance per PATINA trial after completion of chemotherapy -PET/CT due early May, ordered today  RTO in early May

## 2025-03-20 NOTE — ADDENDUM
[FreeTextEntry1] : Documented by Adalgisa Navarro acting as scribe for Dr. Vega on 03/20/2025.   All Medical record entries made by the Scribe were at my, Dr. Vega's, direction and personally dictated by me on 03/20/2025. I have reviewed the chart and agree that the record accurately reflects my personal performance of the history, physical exam, assessment and plan. I have also personally directed, reviewed, and agreed with the discharge instructions.

## 2025-03-20 NOTE — REASON FOR VISIT
[Follow-Up Visit] : a follow-up [Other: _____] : [unfilled] [Language Line ] : provided by Language Line   [FreeTextEntry2] : Breast Cancer  [Interpreters_IDNumber] : [id number] [Interpreters_FullName] : Richard [TWNoteComboBox1] : Dominican

## 2025-03-20 NOTE — HISTORY OF PRESENT ILLNESS
[de-identified] : Ms. Negron is a 52 year old female diagnosed with left breast 8.6cm invasive mammary carcinoma ER >90% RI >90% HER2 3+ Positive at age 51.  Patient presents for initial visit with daughter. Pt's native language is Guamanian. Language Line  # Larisa #[id number]  Patient obtained bilateral mammogram and left sonogram on 9/22/2022 demonstrating right multiple small nodules central right breast, recommendation for targeted imaging and left 3cm mass like area of shadowing retroareolar left breast with skin thickening in conjunction with clinical symptoms, 2cm area of microcalcificatoins left UOQ, recommendation for left central, retroareolar US guided biopsy.  10/11/2022 Underwent left breast retroareolar biopsy- invasive mammary carcinoma with ductal and lobular features,  ER >90% RI >90% HER2 3+, measuring 1.5cm, foci suspicious for intralymphatic tumor embolia are present, cystic papillary apocrine metaplasia   10/25/2022 MR Breasts- findings consistent with inflammatory carcinoma involving the entire left breast. A 7mm internal mammary lymph node is noted. The right breast in unremarkable. No adenopathy is identified.   PMH: Denies  FMH: Denies Sx: Tubal ligation  Socx:  Non smoker, no Etoh.   Care Team:  Breast Surgeon, Dr. Ladd   Treatment summary: -Began TCHP on 11/17/22, carboplatin was dropped after 2 cycles given contralateral lymph node positive disease. -Completed 6 cycles of THP on 3/6/23. -Hospitalized at [facility] from 3/15/23-5/12/23 for septic shock, ARDS s/p ECMO, required vent / PEG, CRRT. -Resumed Herceptin / Perjeta on 9/7/23, and anastrozole was started. She developed diarrhea & she refused further treatment altogether.    [de-identified] : Patient presents for follow up with daughter, who helps interpret Started THP on 11/14/24, received C6 HP and weekly Taxol on 3/20/25 s/p hospitalization at [facility] for URI, discharged on 1/30/25, s/p abx for infection She reports feeling fine overall Denies other pain, nausea, or vomiting Continues to note sensitive skin and redness on chest Continues to report diarrhea 3 days after chemotherapy Denies headaches, dizziness, balance issues

## 2025-05-20 NOTE — PHYSICAL EXAM
[Restricted in physically strenuous activity but ambulatory and able to carry out work of a light or sedentary nature] : Status 1- Restricted in physically strenuous activity but ambulatory and able to carry out work of a light or sedentary nature, e.g., light house work, office work [Normal] : affect appropriate [de-identified] : decrease in erythema of both breasts medially, left is greater than right

## 2025-05-20 NOTE — ASSESSMENT
[FreeTextEntry1] : 53 year old  (LMP 8/2022) female diagnosed with left breast 8.6cm invasive mammary carcinoma ER >99% MT >99% HER2 3+ Positive. On 10/25/2022 MR Breast demonstrated findings consistent with inflammatory carcinoma involving the entire left breast. A 7mm internal mammary lymph node is noted.  S/p punch biopsy of L breast on 10/27/22 - positive for atypical cells in dermal lymphovascular spaces compatible with involvement by mammary carcinoma.  S/p b/l axilla US with abnormal LN bilaterally, s/p biopsy on 11/10/22 -c/w bilateral axillary LNs with poorly differentiated carcinoma, ER >99% MT >99% HER2 3+ Positive S/p port placement on 11/9/22 Began TCHP on 11/17/22, carboplatin was dropped after 2 cycles given contralateral lymph node positive disease.  Completed 6 cycles of THP on 3/6/23. Unfortunately, she was hospitalized at [facility] from 3/15/23-5/12/23 for septic shock, ARDS s/p ECMO, required vent / PEG, CRRT. Fully recovered. Given stage IV disease, resumed Herceptin / Perjeta on 9/7/23, and anastrozole was started. She developed diarrhea & she refused further treatment altogether.    #stage IV left inflammatory breast cancer, with contralateral lymph node involvement, ER 90%, MT <1%, HER2 3+ -Relapsed disease in 10/2024.  10/21/24 PET - 1. Abnormal FDG uptake in the skin of both breasts, subareolar regions, and breast parenchyma. FDG avid soft tissue nodule anterior to the left pectoralis. FDG uptake in the right pectoralis muscle. FDG avid left inner breast cutaneous nodule. This is all concerning for recurrent breast cancer. 2. FDG avid bilateral axillary lymphadenopathy, consistent with metastases 10/24/24 Brain MRI - [facility]ERI -10/24/25 right axilla LN biopsy - Metastatic carcinoma, consistent with metastasis from breast primary. ER 90%, MT <1%, HER2 3+ -relapse >12 months from last Her2 directed therapy -11/14/24 started Taxol /Herceptin / Perjeta. Completed weekly Taxol  4/24/25. Maintenance HP started 5/1/25 -5/7/25 PET -Compared to PET/CT 2/12/2025, stable FDG uptake in bilateral breast skin thickening and breast parenchyma. No new FDG avid lesions. -Will begin antiestrogen therapy with Letrozole and  ibrance per PATINA trial.   We discussed side effects of Letrozole which can include but are not limited to heart disease, fatigue, change in mood, arthritis, arthralgias, osteoporosis, vaginal dryness, hot flashes.   We also reviewed side effects of Ibrance which can include but not limited to fatigue, neutropenia, risk of infection, changes in kidney function, liver enzymes, pneumonitis, rash.  Will check CBC q 2 weeks x 2 months. She signed informed consent.  Lab visit in 2 weeks RTO 5 weeks

## 2025-05-20 NOTE — REASON FOR VISIT
[Follow-Up Visit] : a follow-up [Other: _____] : [unfilled] [Language Line ] : provided by Language Line   [FreeTextEntry2] : Breast Cancer  [Interpreters_IDNumber] : [id number] [Interpreters_FullName] : Richard [TWNoteComboBox1] : Samoan

## 2025-05-20 NOTE — HISTORY OF PRESENT ILLNESS
[de-identified] : Ms. Negron is a 52 year old female diagnosed with left breast 8.6cm invasive mammary carcinoma ER >90% ME >90% HER2 3+ Positive at age 51.  Patient presents for initial visit with daughter. Pt's native language is Latvian. Language Line  # Larisa #[id number]  Patient obtained bilateral mammogram and left sonogram on 9/22/2022 demonstrating right multiple small nodules central right breast, recommendation for targeted imaging and left 3cm mass like area of shadowing retroareolar left breast with skin thickening in conjunction with clinical symptoms, 2cm area of microcalcificatoins left UOQ, recommendation for left central, retroareolar US guided biopsy.  10/11/2022 Underwent left breast retroareolar biopsy- invasive mammary carcinoma with ductal and lobular features,  ER >90% ME >90% HER2 3+, measuring 1.5cm, foci suspicious for intralymphatic tumor embolia are present, cystic papillary apocrine metaplasia   10/25/2022 MR Breasts- findings consistent with inflammatory carcinoma involving the entire left breast. A 7mm internal mammary lymph node is noted. The right breast in unremarkable. No adenopathy is identified.   PMH: Denies  FMH: Denies Sx: Tubal ligation  Socx:  Non smoker, no Etoh.   Care Team:  Breast Surgeon, Dr. Ladd   Treatment summary: -Began TCHP on 11/17/22, carboplatin was dropped after 2 cycles given contralateral lymph node positive disease. -Completed 6 cycles of THP on 3/6/23. -Hospitalized at [facility] from 3/15/23-5/12/23 for septic shock, ARDS s/p ECMO, required vent / PEG, CRRT. -Resumed Herceptin / Perjeta on 9/7/23, and anastrozole was started. She developed diarrhea & she refused further treatment altogether.    [de-identified] : Patient presents for follow up with daughter.  #   She reports feeling fine overall Feels better off chemotherapy

## 2025-06-23 NOTE — ADDENDUM
[FreeTextEntry1] : Documented by Onur Harris acting as scribe for Dr. Vega on  06/23/2025.    All Medical record entries made by the Scribe were at my, Dr. Vega's, direction and personally dictated by me on  06/23/2025. I have reviewed the chart and agree that the record accurately reflects my personal performance of the history, physical exam, assessment and plan. I have also personally directed, reviewed, and agreed with the discharge instructions.

## 2025-06-23 NOTE — REASON FOR VISIT
[Follow-Up Visit] : a follow-up [Other: _____] : [unfilled] [Language Line ] : provided by Language Line   [FreeTextEntry2] : Breast Cancer  [Interpreters_IDNumber] : [id number] [Interpreters_FullName] : Richard [TWNoteComboBox1] : Monegasque

## 2025-06-23 NOTE — PHYSICAL EXAM
[Restricted in physically strenuous activity but ambulatory and able to carry out work of a light or sedentary nature] : Status 1- Restricted in physically strenuous activity but ambulatory and able to carry out work of a light or sedentary nature, e.g., light house work, office work [Normal] : affect appropriate [de-identified] : decrease in erythema of both breasts medially, left is greater than right

## 2025-06-23 NOTE — HISTORY OF PRESENT ILLNESS
[de-identified] : Ms. Negron is a 52 year old female diagnosed with left breast 8.6cm invasive mammary carcinoma ER >90% PA >90% HER2 3+ Positive at age 51.  Patient presents for initial visit with daughter. Pt's native language is Citizen of [place]. Language Line  # Larisa #[id number]  Patient obtained bilateral mammogram and left sonogram on 9/22/2022 demonstrating right multiple small nodules central right breast, recommendation for targeted imaging and left 3cm mass like area of shadowing retroareolar left breast with skin thickening in conjunction with clinical symptoms, 2cm area of microcalcificatoins left UOQ, recommendation for left central, retroareolar US guided biopsy.  10/11/2022 Underwent left breast retroareolar biopsy- invasive mammary carcinoma with ductal and lobular features,  ER >90% PA >90% HER2 3+, measuring 1.5cm, foci suspicious for intralymphatic tumor embolia are present, cystic papillary apocrine metaplasia   10/25/2022 MR Breasts- findings consistent with inflammatory carcinoma involving the entire left breast. A 7mm internal mammary lymph node is noted. The right breast in unremarkable. No adenopathy is identified.   PMH: Denies  FMH: Denies Sx: Tubal ligation  Socx:  Non smoker, no Etoh.   Care Team:  Breast Surgeon, Dr. Ladd   Treatment summary: -Began TCHP on 11/17/22, carboplatin was dropped after 2 cycles given contralateral lymph node positive disease. -Completed 6 cycles of THP on 3/6/23. -Hospitalized at [facility] from 3/15/23-5/12/23 for septic shock, ARDS s/p ECMO, required vent / PEG, CRRT. -Resumed Herceptin / Perjeta on 9/7/23, and anastrozole was started. She developed diarrhea & she refused further treatment altogether.    [de-identified] : Patient presents for follow up with daughter.  # [id number] Started Letrozole + Ibrance on ~5/29/25 She reports feeling fine overall Reports diarrhea last week from Monday - Friday, experienced during 3rd week of Ibrance, last took Ibrance on 6/19/25 She reports when she has diarrhea, ~5 bowel movements a day, completely liquid, she takes Immodium with some improvement

## 2025-06-23 NOTE — ASSESSMENT
[FreeTextEntry1] : 53 year old  (LMP 8/2022) female diagnosed with left breast 8.6cm invasive mammary carcinoma ER >99% MN >99% HER2 3+ Positive. On 10/25/2022 MR Breast demonstrated findings consistent with inflammatory carcinoma involving the entire left breast. A 7mm internal mammary lymph node is noted.  S/p punch biopsy of L breast on 10/27/22 - positive for atypical cells in dermal lymphovascular spaces compatible with involvement by mammary carcinoma.  S/p b/l axilla US with abnormal LN bilaterally, s/p biopsy on 11/10/22 -c/w bilateral axillary LNs with poorly differentiated carcinoma, ER >99% MN >99% HER2 3+ Positive S/p port placement on 11/9/22 Began TCHP on 11/17/22, carboplatin was dropped after 2 cycles given contralateral lymph node positive disease.  Completed 6 cycles of THP on 3/6/23. Unfortunately, she was hospitalized at [facility] from 3/15/23-5/12/23 for septic shock, ARDS s/p ECMO, required vent / PEG, CRRT. Fully recovered. Given stage IV disease, resumed Herceptin / Perjeta on 9/7/23, and anastrozole was started. She developed diarrhea & she refused further treatment altogether.    #stage IV left inflammatory breast cancer, with contralateral lymph node involvement, ER 90%, MN <1%, HER2 3+ -Relapsed disease in 10/2024.  10/21/24 PET - 1. Abnormal FDG uptake in the skin of both breasts, subareolar regions, and breast parenchyma. FDG avid soft tissue nodule anterior to the left pectoralis. FDG uptake in the right pectoralis muscle. FDG avid left inner breast cutaneous nodule. This is all concerning for recurrent breast cancer. 2. FDG avid bilateral axillary lymphadenopathy, consistent with metastases 10/24/24 Brain MRI - NERI -10/24/25 right axilla LN biopsy - Metastatic carcinoma, consistent with metastasis from breast primary. ER 90%, MN <1%, HER2 3+ -relapse >12 months from last Her2 directed therapy -11/14/24 started Taxol /Herceptin / Perjeta. Completed weekly Taxol  4/24/25. Maintenance HP started 5/1/25 -5/7/25 PET -Compared to PET/CT 2/12/2025, stable FDG uptake in bilateral breast skin thickening and breast parenchyma. No new FDG avid lesions. - Started Letrozole and Ibrance on ~5/29/25 per PATINA trial -Grade 2 Diarrhea - for optimal therapy, Advised to take Immodium 2x at first bowel movement, 1x more for each bowel movement per day, up to 8x per day - Neutropenia due to ibrance - CBC from today pending  Lab visits q 2 weeks  x 2 times. RTO 1 months w/ NP

## 2025-07-30 NOTE — ASSESSMENT
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 On 10/25/2022 MR Breast demonstrated findings consistent with inflammatory carcinoma involving the entire left breast. A 7mm internal mammary lymph node is noted.  S/p punch biopsy of L breast on 10/27/22 - positive for atypical cells in dermal lymphovascular spaces compatible with involvement by mammary carcinoma.  S/p b/l axilla US with abnormal LN bilaterally, s/p biopsy on 11/10/22 -c/w bilateral axillary LNs with poorly differentiated carcinoma, ER >99% ND >99% HER2 3+ Positive S/p port placement on 11/9/22 Began TCHP on 11/17/22, carboplatin was dropped after 2 cycles given contralateral lymph node positive disease.  Completed 6 cycles of THP on 3/6/23. Unfortunately, she was hospitalized at [facility] from 3/15/23-5/12/23 for septic shock, ARDS s/p ECMO, required vent / PEG, CRRT. Fully recovered. Given stage IV disease, resumed Herceptin / Perjeta on 9/7/23, and anastrozole was started. She developed diarrhea & she refused further treatment altogether.    #stage IV left inflammatory breast cancer, with contralateral lymph node involvement, ER 90%, ND <1%, HER2 3+ -Relapsed disease in 10/2024.  10/21/24 PET - 1. Abnormal FDG uptake in the skin of both breasts, subareolar regions, and breast parenchyma. FDG avid soft tissue nodule anterior to the left pectoralis. FDG uptake in the right pectoralis muscle. FDG avid left inner breast cutaneous nodule. This is all concerning for recurrent breast cancer. 2. FDG avid bilateral axillary lymphadenopathy, consistent with metastases 10/24/24 Brain MRI - NERI -10/24/25 right axilla LN biopsy - Metastatic carcinoma, consistent with metastasis from breast primary. ER 90%, ND <1%, HER2 3+ -relapse >12 months from last Her2 directed therapy -11/14/24 started Taxol /Herceptin / Perjeta. Completed weekly Taxol  4/24/25. Maintenance HP started 5/1/25 -5/7/25 PET -Compared to PET/CT 2/12/2025, stable FDG uptake in bilateral breast skin thickening and breast parenchyma. No new FDG avid lesions. - Started Letrozole and Ibrance on ~5/29/25 per PATINA trial -Grade 2 Diarrhea - resolved. Advised if re-occurs to take Imodium as directed and notify provider  -S/p Herceptin/Perjeta on 7/7/25, continue every 3 weeks, due today  -Advised to obtain ECHO now -Advised to obtain PET/CT mid 8/2025  Reviewed CBC from today WBC 3.67 K HGB 11.8 g  HCT 35.1 %PLT	315 K  Auto Neutrophils # 1.81 K Advised to call with concerns/symptoms RTO 1 month with Dr Vega or sooner if needed

## 2025-07-30 NOTE — REASON FOR VISIT
[Follow-Up Visit] : a follow-up [Other: _____] : [unfilled] [Language Line ] : provided by Language Line   [FreeTextEntry2] : Breast Cancer  [Interpreters_IDNumber] : [id number] [Interpreters_FullName] : Richard [TWNoteComboBox1] : Citizen of [place]

## 2025-07-30 NOTE — HISTORY OF PRESENT ILLNESS
[de-identified] : Ms. Negron is a 52 year old female diagnosed with left breast 8.6cm invasive mammary carcinoma ER >90% ME >90% HER2 3+ Positive at age 51.  Patient presents for initial visit with daughter. Pt's native language is Tuvaluan. Language Line  # Larisa #[id number]  Patient obtained bilateral mammogram and left sonogram on 9/22/2022 demonstrating right multiple small nodules central right breast, recommendation for targeted imaging and left 3cm mass like area of shadowing retroareolar left breast with skin thickening in conjunction with clinical symptoms, 2cm area of microcalcificatoins left UOQ, recommendation for left central, retroareolar US guided biopsy.  10/11/2022 Underwent left breast retroareolar biopsy- invasive mammary carcinoma with ductal and lobular features,  ER >90% ME >90% HER2 3+, measuring 1.5cm, foci suspicious for intralymphatic tumor embolia are present, cystic papillary apocrine metaplasia   10/25/2022 MR Breasts- findings consistent with inflammatory carcinoma involving the entire left breast. A 7mm internal mammary lymph node is noted. The right breast in unremarkable. No adenopathy is identified.   PMH: Denies  FMH: Denies Sx: Tubal ligation  Socx:  Non smoker, no Etoh.   Care Team:  Breast Surgeon, Dr. Ladd   Treatment summary: -Began TCHP on 11/17/22, carboplatin was dropped after 2 cycles given contralateral lymph node positive disease. -Completed 6 cycles of THP on 3/6/23. -Hospitalized at [facility] from 3/15/23-5/12/23 for septic shock, ARDS s/p ECMO, required vent / PEG, CRRT. -Resumed Herceptin / Perjeta on 9/7/23, and anastrozole was started. She developed diarrhea & she refused further treatment altogether.    [de-identified] : Patient presents for follow up visit. Patient seen in treatment room. Language Line  Leola ID # [id number]  Notes compliance to Letrozole + Ibrance  S/p  Herceptin/Perjeta on 7/7/25 Notes 3 episodes of diarrhea on 7/8/25, took Imodium x 1 dose with complete resolution  Denies headache, dizziness   Denies fever, cough, dyspnea, leg swelling  Denies abdominal pain, nausea, vomiting, weight loss, diarrhea  Good appetite. Drinking adequate daily water  Feels overall well